# Patient Record
Sex: FEMALE | Race: WHITE | NOT HISPANIC OR LATINO | URBAN - METROPOLITAN AREA
[De-identification: names, ages, dates, MRNs, and addresses within clinical notes are randomized per-mention and may not be internally consistent; named-entity substitution may affect disease eponyms.]

---

## 2019-02-18 ENCOUNTER — APPOINTMENT (RX ONLY)
Dept: URBAN - METROPOLITAN AREA CLINIC 40 | Facility: CLINIC | Age: 44
Setting detail: DERMATOLOGY
End: 2019-02-18

## 2019-02-18 DIAGNOSIS — L81.1 CHLOASMA: ICD-10-CM

## 2019-02-18 PROBLEM — R23.3 SPONTANEOUS ECCHYMOSES: Status: ACTIVE | Noted: 2019-02-18

## 2019-02-18 PROBLEM — F41.9 ANXIETY DISORDER, UNSPECIFIED: Status: ACTIVE | Noted: 2019-02-18

## 2019-02-18 PROBLEM — L85.3 XEROSIS CUTIS: Status: ACTIVE | Noted: 2019-02-18

## 2019-02-18 PROCEDURE — ? FRAXEL

## 2019-02-18 NOTE — PROCEDURE: FRAXEL
Length Of Topical Anesthesia Application (Optional): 90 minutes
Number Of Passes: 1
Price (Use Numbers Only, No Special Characters Or $): 1200
Number Of Passes: 6
Consent: Written consent obtained, risks reviewed including but not limited to pain and incomplete improvement.
Wavelength: 1550nm
Total Coverage: 45%
Total Energy In Kj (Optional- Don't Include Units): 3.13
Detail Level: Zone
Add Post-Care Below To The Note: No
Wavelength: 1927nm
Location: full face except eyelids
Anesthesia Type: 0.075% Kleins solution (tumescent anesthesia)
Post-Care Instructions: I reviewed with the patient in detail post-care instructions. Patient should avoid sun until area fully healed.
Total Energy In Kj (Optional- Don't Include Units): 1.11
Indication: melasma
Energy(Mj/Cm2): 25
External Cooling: Josse Cryo 5
Treatment Level: 8
Topical Anesthesia Type: 23% Lidocaine 7% Tetracaine
Energy(Mj/Cm2): 10
External Cooling Fan Speed: 5

## 2019-03-18 ENCOUNTER — APPOINTMENT (RX ONLY)
Dept: URBAN - METROPOLITAN AREA CLINIC 40 | Facility: CLINIC | Age: 44
Setting detail: DERMATOLOGY
End: 2019-03-18

## 2019-03-18 DIAGNOSIS — Z41.9 ENCOUNTER FOR PROCEDURE FOR PURPOSES OTHER THAN REMEDYING HEALTH STATE, UNSPECIFIED: ICD-10-CM

## 2019-03-18 PROCEDURE — ? FRAXEL

## 2019-03-18 ASSESSMENT — LOCATION SIMPLE DESCRIPTION DERM: LOCATION SIMPLE: RIGHT FOREHEAD

## 2019-03-18 ASSESSMENT — LOCATION ZONE DERM: LOCATION ZONE: FACE

## 2019-03-18 ASSESSMENT — LOCATION DETAILED DESCRIPTION DERM: LOCATION DETAILED: RIGHT INFERIOR MEDIAL FOREHEAD

## 2019-03-18 NOTE — PROCEDURE: FRAXEL
Treatment Level: 1
Length Of Topical Anesthesia Application (Optional): 90 minutes
Total Energy In Kj (Optional- Don't Include Units): 3.79
Number Of Passes: 6
Post-Care Instructions: \\n*  Immediately after your treatment, you will experience redness and swelling.  You will notice most of the swelling on the morning after your treatment.  Swelling usually lasts 2-5 days, depending on how sensitive you may be.  To minimize swelling, you may do the following:\\n     > Apply cold compresses to the treatment area on the day of treatment.  If possible, sleep with your head elevated the first night. \\n     > Over the next few days, swelling and redness may increase.  \\n     > You may also notice that your skin appears to be bronzed or little dark spots will appear on the treated area.  \\n     > Your skin may feel dry, peel, or flake.  You may notice a “sandpaper” texture.  This is the treated skin working its way out of your body as new fresh skin is regenerated.  This should start sloughing off 3-4 days after treatment.  This process should be complete 5-7 days after a treatment on the face.  For treated areas such as arms/hands, the healing process is slower and could take up to 2 weeks.  It is normal to notice some pinkness over the next few weeks.  Makeup may be applied to minimize the redness.  \\n     > Please allow your skin to heal and do not scrub or exfoliate.\\n* Continue your antibiotic and /or Valtrex prescription as directed if it was prescribed to you.\\n* Take Benadryl at bedtime and ice your face to decrease swelling\\n* Continue cleansing with a gentle cleanser and moisturizing for thE first week after treatment. (Hylunia, Obagi, Aveeno, Cetaphil, Cerave)\\n* Do not use any retinoid products for 1 week following your treatment. (includes Differin, Retin-A, and any over the counter products containing retinoid)\\n* It is very important that you avoid direct sun exposure for 1 week following your treatment and continue to maintain sun protection.
Wavelength: 1927nm
Energy(Mj/Cm2): 25
Total Energy In Kj (Optional- Don't Include Units): 1.30
Indication: melasma
Location: full face except eyelids
Treatment Level: 8
Price (Use Numbers Only, No Special Characters Or $): 1200
Detail Level: Zone
Energy(Mj/Cm2): 10
Wavelength: 1550nm
Consent: Written consent obtained, risks reviewed including but not limited to pain and incomplete improvement.
Treatment Number: 2
Topical Anesthesia Type: 23% Lidocaine 7% Tetracaine
Add Post-Care Below To The Note: Yes

## 2019-05-17 ENCOUNTER — APPOINTMENT (RX ONLY)
Dept: URBAN - METROPOLITAN AREA CLINIC 40 | Facility: CLINIC | Age: 44
Setting detail: DERMATOLOGY
End: 2019-05-17

## 2019-05-17 DIAGNOSIS — Z41.9 ENCOUNTER FOR PROCEDURE FOR PURPOSES OTHER THAN REMEDYING HEALTH STATE, UNSPECIFIED: ICD-10-CM

## 2019-05-17 PROCEDURE — ? JUVEDERM VOLUMA XC INJECTION

## 2019-05-17 PROCEDURE — ? BOTOX (U OR CC)

## 2019-05-17 ASSESSMENT — LOCATION SIMPLE DESCRIPTION DERM
LOCATION SIMPLE: LEFT TEMPLE
LOCATION SIMPLE: LEFT CHEEK
LOCATION SIMPLE: RIGHT CORRUGATOR SUPERCILIARIS
LOCATION SIMPLE: LEFT FRONTALIS
LOCATION SIMPLE: RIGHT CHEEK
LOCATION SIMPLE: RIGHT FRONTALIS
LOCATION SIMPLE: LEFT CORRUGATOR SUPERCILIARIS
LOCATION SIMPLE: PROCERUS
LOCATION SIMPLE: RIGHT TEMPLE

## 2019-05-17 ASSESSMENT — LOCATION DETAILED DESCRIPTION DERM
LOCATION DETAILED: LEFT CORRUGATOR SUPERCILIARIS
LOCATION DETAILED: RIGHT FRONTALIS
LOCATION DETAILED: RIGHT CORRUGATOR SUPERCILIARIS
LOCATION DETAILED: RIGHT MEDIAL MALAR CHEEK
LOCATION DETAILED: RIGHT INFERIOR TEMPLE
LOCATION DETAILED: RIGHT SUPERIOR LATERAL MALAR CHEEK
LOCATION DETAILED: LEFT INFERIOR TEMPLE
LOCATION DETAILED: PROCERUS
LOCATION DETAILED: LEFT MEDIAL MALAR CHEEK
LOCATION DETAILED: LEFT CENTRAL MALAR CHEEK
LOCATION DETAILED: LEFT FRONTALIS
LOCATION DETAILED: RIGHT CENTRAL MALAR CHEEK

## 2019-05-17 ASSESSMENT — LOCATION ZONE DERM: LOCATION ZONE: FACE

## 2019-05-17 NOTE — HPI: COSMETIC CONSULTATION
Additional History: Pt has not had Botox or fillers in the past. Pt target areas are glabella,  marionette lines, and fat loss under her eyes. Pt would also like to discuss if filler would help eliminate fine lines on her lips.

## 2019-05-17 NOTE — PROCEDURE: BOTOX (U OR CC)
Spoke with [facility], 1 year supply on file, pt needs to request refill from pharmacy.  My chart message sent Post-Care Instructions: *  Do not lie flat for 4 hours after treatment\\n*  Do not rub or massage the face\\n*  Avoid exercise the day of the treatment\\n*  Avoid taking aspirin, ibuprofen, fish oil, ginko, and vitamin E for 1 week following your treatment to limit bruising.\\n*  Allow 7-10 days to see full effects of botox\\n*  To maintain results and lessen facial lines over time, it is recommended to have treatments every 3-4 months